# Patient Record
Sex: FEMALE | Race: WHITE
[De-identification: names, ages, dates, MRNs, and addresses within clinical notes are randomized per-mention and may not be internally consistent; named-entity substitution may affect disease eponyms.]

---

## 2020-11-24 ENCOUNTER — HOSPITAL ENCOUNTER (OUTPATIENT)
Dept: HOSPITAL 74 - FASUSAT | Age: 70
LOS: 1 days | Discharge: HOME | End: 2020-11-25
Attending: PLASTIC SURGERY
Payer: COMMERCIAL

## 2020-11-24 VITALS — BODY MASS INDEX: 27.1 KG/M2

## 2020-11-24 DIAGNOSIS — C44.311: Primary | ICD-10-CM

## 2020-11-24 PROCEDURE — 0HX1XZZ TRANSFER FACE SKIN, EXTERNAL APPROACH: ICD-10-PCS | Performed by: PLASTIC SURGERY

## 2020-11-24 PROCEDURE — 0JX10ZB TRANSFER FACE SUBCUTANEOUS TISSUE AND FASCIA WITH SKIN AND SUBCUTANEOUS TISSUE, OPEN APPROACH: ICD-10-PCS | Performed by: PLASTIC SURGERY

## 2020-11-24 PROCEDURE — 0HR1X73 REPLACEMENT OF FACE SKIN WITH AUTOLOGOUS TISSUE SUBSTITUTE, FULL THICKNESS, EXTERNAL APPROACH: ICD-10-PCS | Performed by: PLASTIC SURGERY

## 2020-11-24 PROCEDURE — 0HBAXZZ EXCISION OF INGUINAL SKIN, EXTERNAL APPROACH: ICD-10-PCS | Performed by: PLASTIC SURGERY

## 2020-11-24 RX ADMIN — CEFAZOLIN SODIUM SCH MLS/HR: 1 INJECTION, SOLUTION INTRAVENOUS at 20:30

## 2020-11-24 RX ADMIN — ONDANSETRON PRN MG: 2 INJECTION INTRAMUSCULAR; INTRAVENOUS at 23:49

## 2020-11-25 VITALS — TEMPERATURE: 97.9 F | DIASTOLIC BLOOD PRESSURE: 64 MMHG | HEART RATE: 72 BPM | SYSTOLIC BLOOD PRESSURE: 137 MMHG

## 2020-11-25 RX ADMIN — ONDANSETRON PRN MG: 2 INJECTION INTRAMUSCULAR; INTRAVENOUS at 05:32

## 2020-11-25 RX ADMIN — CEFAZOLIN SODIUM SCH MLS/HR: 1 INJECTION, SOLUTION INTRAVENOUS at 02:45

## 2020-11-25 RX ADMIN — CEFAZOLIN SODIUM SCH MLS/HR: 1 INJECTION, SOLUTION INTRAVENOUS at 08:51

## 2020-12-21 ENCOUNTER — HOSPITAL ENCOUNTER (OUTPATIENT)
Dept: HOSPITAL 74 - FASU | Age: 70
Discharge: HOME | End: 2020-12-21
Attending: PLASTIC SURGERY
Payer: COMMERCIAL

## 2020-12-21 VITALS — HEART RATE: 74 BPM | DIASTOLIC BLOOD PRESSURE: 82 MMHG | SYSTOLIC BLOOD PRESSURE: 150 MMHG

## 2020-12-21 VITALS — BODY MASS INDEX: 27.3 KG/M2

## 2020-12-21 VITALS — TEMPERATURE: 97.9 F

## 2020-12-21 DIAGNOSIS — C44.321: Primary | ICD-10-CM

## 2020-12-21 PROCEDURE — 0H81XZZ DIVISION OF FACE SKIN, EXTERNAL APPROACH: ICD-10-PCS | Performed by: PLASTIC SURGERY

## 2023-05-13 ENCOUNTER — TRANSCRIPTION ENCOUNTER (OUTPATIENT)
Age: 73
End: 2023-05-13

## 2023-05-14 ENCOUNTER — TRANSCRIPTION ENCOUNTER (OUTPATIENT)
Age: 73
End: 2023-05-14

## 2023-05-15 ENCOUNTER — TRANSCRIPTION ENCOUNTER (OUTPATIENT)
Age: 73
End: 2023-05-15

## 2023-05-16 ENCOUNTER — APPOINTMENT (OUTPATIENT)
Dept: CARE COORDINATION | Facility: HOME HEALTH | Age: 73
End: 2023-05-16
Payer: MEDICARE

## 2023-05-16 VITALS
SYSTOLIC BLOOD PRESSURE: 110 MMHG | RESPIRATION RATE: 16 BRPM | HEART RATE: 65 BPM | DIASTOLIC BLOOD PRESSURE: 62 MMHG | OXYGEN SATURATION: 99 %

## 2023-05-16 DIAGNOSIS — Z78.9 OTHER SPECIFIED HEALTH STATUS: ICD-10-CM

## 2023-05-16 PROBLEM — Z00.00 ENCOUNTER FOR PREVENTIVE HEALTH EXAMINATION: Status: ACTIVE | Noted: 2023-05-16

## 2023-05-16 PROCEDURE — 99349 HOME/RES VST EST MOD MDM 40: CPT

## 2023-05-20 PROBLEM — Z78.9 DENIES ALCOHOL CONSUMPTION: Status: ACTIVE | Noted: 2023-05-20

## 2023-05-20 RX ORDER — TICAGRELOR 90 MG/1
90 TABLET ORAL
Refills: 0 | Status: ACTIVE | COMMUNITY
Start: 2023-05-12

## 2023-05-20 RX ORDER — LANSOPRAZOLE 30 MG/1
30 CAPSULE, DELAYED RELEASE ORAL DAILY
Refills: 0 | Status: ACTIVE | COMMUNITY

## 2023-05-20 RX ORDER — PREGABALIN 75 MG/1
75 CAPSULE ORAL
Refills: 0 | Status: ACTIVE | COMMUNITY
Start: 2023-04-24

## 2023-05-20 RX ORDER — IPRATROPIUM BROMIDE 42 UG/1
0.06 SPRAY NASAL
Qty: 15 | Refills: 0 | Status: ACTIVE | COMMUNITY
Start: 2023-01-09

## 2023-05-20 RX ORDER — ABATACEPT 125 MG/ML
125 INJECTION, SOLUTION SUBCUTANEOUS
Refills: 0 | Status: ACTIVE | COMMUNITY

## 2023-05-20 RX ORDER — INSULIN GLARGINE 100 [IU]/ML
100 INJECTION, SOLUTION SUBCUTANEOUS
Refills: 0 | Status: ACTIVE | COMMUNITY

## 2023-05-20 RX ORDER — ATORVASTATIN CALCIUM 80 MG/1
80 TABLET, FILM COATED ORAL
Refills: 0 | Status: ACTIVE | COMMUNITY
Start: 2022-10-10

## 2023-05-20 RX ORDER — CALCIUM CARB/VIT D2/MINERALS
TABLET ORAL
Refills: 0 | Status: ACTIVE | COMMUNITY

## 2023-05-20 RX ORDER — ATENOLOL 50 MG/1
50 TABLET ORAL DAILY
Refills: 0 | Status: ACTIVE | COMMUNITY
Start: 2022-12-21

## 2023-05-20 RX ORDER — ASPIRIN 81 MG/1
81 TABLET, COATED ORAL
Refills: 0 | Status: ACTIVE | COMMUNITY
Start: 2023-05-12

## 2023-05-20 RX ORDER — ALENDRONATE SODIUM 70 MG/1
70 TABLET ORAL
Qty: 12 | Refills: 0 | Status: ACTIVE | COMMUNITY
Start: 2022-06-02

## 2023-05-20 RX ORDER — INSULIN LISPRO 100 [IU]/ML
100 INJECTION, SOLUTION INTRAVENOUS; SUBCUTANEOUS
Refills: 0 | Status: ACTIVE | COMMUNITY

## 2023-05-20 RX ORDER — MULTIVIT-MIN/FOLIC/VIT K/LYCOP 400-300MCG
50 MCG TABLET ORAL
Refills: 0 | Status: ACTIVE | COMMUNITY

## 2023-05-20 RX ORDER — METHYLPREDNISOLONE 8 MG/1
TABLET ORAL DAILY
Refills: 0 | Status: ACTIVE | COMMUNITY

## 2023-05-20 NOTE — REVIEW OF SYSTEMS
[Fever] : no fever [Chills] : no chills [Fatigue] : fatigue [Lower Ext Edema] : no lower extremity edema [Shortness Of Breath] : no shortness of breath [Dyspnea on Exertion] : no dyspnea on exertion [Abdominal Pain] : no abdominal pain [Nausea] : no nausea [Constipation] : no constipation [Diarrhea] : diarrhea [Vomiting] : no vomiting [Negative] : Psychiatric [FreeTextEntry5] : Mild chest discomfort [de-identified] : Right arm and groin brusing

## 2023-05-20 NOTE — PLAN
[FreeTextEntry1] : - Follow-up with PCP tomorrow as scheduled\par \par - Follow-up with Dr. Hoover as advised\par \par - Follow-up TCM NP visit scheduled to 5/22\par \par - Patient verbalized understanding of plan as above, advised to call with any questions or concerns.  Yellow card with contact info given.\par \par

## 2023-05-20 NOTE — ASSESSMENT
[FreeTextEntry1] : Patient is a 72 year y/o female enrolled in the [facility] program with a history of CAD, hypertension, hyperlipidemia, lupus, DM, RA recently admitted from 5/11/23-5/13/23 to [facility] for STEMI.  Hospital record reviewed, as per patient discharge summary:\par \par "Admission History\par Patient is a 72-year-old female with a history of CAD, hypertension, hyperlipidemia, lupus, DM, RA, multiple previous surgeries with chronic pain who presented as an outpatient for elective cardiac cath after she had been having crescendo angina for several months and equivocal outpatient nuclear stress test last year.  She underwent PCI today and was found to have two-vessel CAD with 80 to 90% proximal LAD occlusion and an 80% RCA occlusion both of which received stent placement.  Her left main and left circumflex arteries were patent.  After she went to cath recovery she developed significant vomiting and new onset chest pain and was found to have new ST elevations in the inferior leads.  She vomited up all of her initial aspirin and antiplatelet agents.  She returned to the Cath Lab for repeat PCI and was found to have in-stent thromboses of both stents.  She had balloon angioplasties to both stents, followed by IVUS to confirm patency.  There is no evidence of either coronary artery dissection or aortic dissection on aortogram.  Her LVEDP was reassuring as well as her LV gram.  She was put on a cangrelor drip.  She had an intra-aortic balloon pump placed in the left femoral artery.  She was subsequently transferred to the ICU for continued cangrelor infusion, heparin infusion, management of intra-aortic balloon pump, post STEMI care.  The cardiologist, Dr. Hinds, recommended checking a stat echo as well as initiating aspirin and Plavix and gentle IV fluids.\par \par Hospital Course\par Patient is s/p PCI of prox LAD with CAROLYNE x1 and PCI of prox-mid RCA with CAROLYNE x1 on 5/12/23 via RFA (unsuccessful RRA access), complicated by chest pain and inferior ST elevations post-cath, requiring repeat cath showing small thrombus in RCA and LAD stents, treated with IVUS-guided balloon angioplasty on 5/11/23.  Patient is now status post removal of the intra-aortic balloon pump (5/12 AM) and is clinically stable. She was monitored overnight on telemetry with no events. Labs/EKG this AM are stable. Patients course was complicated by significant hematoma at the site of the right radial cath insertion in the left femoral artery intra-aortic balloon pump site. Both of these sites have remained stable with no expansion. The areas are soft to touch. Hemoglobin remains stable and up from yesterday at 9.9. Evaluated by cardiac cath team this AM who cleared her for discharge home today. She will be discharged on ASA/Brilinta and will continue her statin. She will follow up with her cardiologist in 2 weeks. At this time patient is table for discharge home.\par \par Patient contact by Nely Jones RN on 5/15/23 and d/c instructions reviewed.  Discharge medications were reviewed and reconciled with the current medication list and medications in home.  Documentation can be found in clinical viewer.\par \par \par Patient evaluated in home and on arrival patient alert and oriented x3, and in no acute distress.  Patient visualize walking in home and gait appears stable without assistance.  Patient states she is feeling fatigued and having discomfort in right arm and groin from all the brusing and bleeding from the procedures. Right are brusing noted, and brusing to groin and hips.  On exam feels soft to touch.  Patient denies chest pain, but a mild discomfort that she has had since the procedure and cardiology aware.  Advised to go directly to the ER for any worsening chest discomfort or pain.  Patient states she has a follow-up appointment with PCP tomorrow and is in the process of scheduling cardiology.  Patient denies chest pain, palpitations, shortness of breath, abdominal pain, nausea, vomiting, diarrhea, lightheaded or dizziness.\par \par Patient with no other questions or concerns at this time.  Patient given yellow card with contact information on it and advised to call with any questions or concerns.\par

## 2023-05-20 NOTE — PHYSICAL EXAM
[No Acute Distress] : no acute distress [Well Developed] : well developed [Well Nourished] : well nourished [Well-Appearing] : well-appearing [Normal Sclera/Conjunctiva] : normal sclera/conjunctiva [Normal Outer Ear/Nose] : the outer ears and nose were normal in appearance [Supple] : supple [No Respiratory Distress] : no respiratory distress  [No Accessory Muscle Use] : no accessory muscle use [Clear to Auscultation] : lungs were clear to auscultation bilaterally [Regular Rhythm] : with a regular rhythm [Normal Rate] : normal rate  [Normal S1, S2] : normal S1 and S2 [No Edema] : there was no peripheral edema [Soft] : abdomen soft [Non Tender] : non-tender [Non-distended] : non-distended [Normal Bowel Sounds] : normal bowel sounds [No Focal Deficits] : no focal deficits [Normal Gait] : normal gait [Normal Affect] : the affect was normal [Alert and Oriented x3] : oriented to person, place, and time [Normal Mood] : the mood was normal [Normal Insight/Judgement] : insight and judgment were intact [de-identified] : ecchymosis to right arm, groin, and b/i hips, soft to touch

## 2023-05-20 NOTE — HISTORY OF PRESENT ILLNESS
[FreeTextEntry1] : Follow-up for discharge from [facility] for NSTEMI\par  [de-identified] : Patient is a 72 year y/o female enrolled in the [facility] program with a history of CAD, hypertension, hyperlipidemia, lupus, DM, RA recently admitted from 5/11/23-5/13/23 to [facility] for STEMI.  Hospital record reviewed, as per patient discharge summary:\par \par "Admission History\par Patient is a 72-year-old female with a history of CAD, hypertension, hyperlipidemia, lupus, DM, RA, multiple previous surgeries with chronic pain who presented as an outpatient for elective cardiac cath after she had been having crescendo angina for several months and equivocal outpatient nuclear stress test last year.  She underwent PCI today and was found to have two-vessel CAD with 80 to 90% proximal LAD occlusion and an 80% RCA occlusion both of which received stent placement.  Her left main and left circumflex arteries were patent.  After she went to cath recovery she developed significant vomiting and new onset chest pain and was found to have new ST elevations in the inferior leads.  She vomited up all of her initial aspirin and antiplatelet agents.  She returned to the Cath Lab for repeat PCI and was found to have in-stent thromboses of both stents.  She had balloon angioplasties to both stents, followed by IVUS to confirm patency.  There is no evidence of either coronary artery dissection or aortic dissection on aortogram.  Her LVEDP was reassuring as well as her LV gram.  She was put on a cangrelor drip.  She had an intra-aortic balloon pump placed in the left femoral artery.  She was subsequently transferred to the ICU for continued cangrelor infusion, heparin infusion, management of intra-aortic balloon pump, post STEMI care.  The cardiologist, Dr. Hinds, recommended checking a stat echo as well as initiating aspirin and Plavix and gentle IV fluids.\par \par Hospital Course\par Patient is s/p PCI of prox LAD with CAROLYNE x1 and PCI of prox-mid RCA with CAROLYNE x1 on 5/12/23 via RFA (unsuccessful RRA access), complicated by chest pain and inferior ST elevations post-cath, requiring repeat cath showing small thrombus in RCA and LAD stents, treated with IVUS-guided balloon angioplasty on 5/11/23.  Patient is now status post removal of the intra-aortic balloon pump (5/12 AM) and is clinically stable. She was monitored overnight on telemetry with no events. Labs/EKG this AM are stable. Patients course was complicated by significant hematoma at the site of the right radial cath insertion in the left femoral artery intra-aortic balloon pump site. Both of these sites have remained stable with no expansion. The areas are soft to touch. Hemoglobin remains stable and up from yesterday at 9.9. Evaluated by cardiac cath team this AM who cleared her for discharge home today. She will be discharged on ASA/Brilinta and will continue her statin. She will follow up with her cardiologist in 2 weeks. At this time patient is table for discharge home.\par \par Patient contact by Nely Jones RN on 5/15/23 and d/c instructions reviewed.  Discharge medications were reviewed and reconciled with the current medication list and medications in home.  Documentation can be found in clinical viewer.\par \par \par Patient evaluated in home and on arrival patient alert and oriented x3, and in no acute distress.  Patient visualize walking in home and gait appears stable without assistance.  Patient states she is feeling fatigued and having discomfort in right arm and groin from all the brusing and bleeding from the procedures. Right are brusing noted, and brusing to groin and hips.  On exam feels soft to touch.  Patient denies chest pain, but a mild discomfort that she has had since the procedure and cardiology aware.  Advised to go directly to the ER for any worsening chest discomfort or pain.  Patient states she has a follow-up appointment with PCP tomorrow and is in the process of scheduling cardiology.  Patient denies chest pain, palpitations, shortness of breath, abdominal pain, nausea, vomiting, diarrhea, lightheaded or dizziness.\par \par Patient with no other questions or concerns at this time.  Patient given yellow card with contact information on it and advised to call with any questions or concerns.\par \par \par

## 2023-05-22 ENCOUNTER — APPOINTMENT (OUTPATIENT)
Dept: CARE COORDINATION | Facility: HOME HEALTH | Age: 73
End: 2023-05-22
Payer: MEDICARE

## 2023-05-22 VITALS — DIASTOLIC BLOOD PRESSURE: 58 MMHG | SYSTOLIC BLOOD PRESSURE: 96 MMHG | HEART RATE: 64 BPM | RESPIRATION RATE: 16 BRPM

## 2023-05-22 DIAGNOSIS — I21.3 ST ELEVATION (STEMI) MYOCARDIAL INFARCTION OF UNSPECIFIED SITE: ICD-10-CM

## 2023-05-22 DIAGNOSIS — Z98.890 OTHER SPECIFIED POSTPROCEDURAL STATES: ICD-10-CM

## 2023-05-22 DIAGNOSIS — M32.9 SYSTEMIC LUPUS ERYTHEMATOSUS, UNSPECIFIED: ICD-10-CM

## 2023-05-22 DIAGNOSIS — R58 HEMORRHAGE, NOT ELSEWHERE CLASSIFIED: ICD-10-CM

## 2023-05-22 DIAGNOSIS — I10 ESSENTIAL (PRIMARY) HYPERTENSION: ICD-10-CM

## 2023-05-22 DIAGNOSIS — I00 RHEUMATIC FEVER W/OUT HEART INVOLVEMENT: ICD-10-CM

## 2023-05-22 DIAGNOSIS — E11.9 TYPE 2 DIABETES MELLITUS W/OUT COMPLICATIONS: ICD-10-CM

## 2023-05-22 PROCEDURE — 99349 HOME/RES VST EST MOD MDM 40: CPT

## 2023-05-23 PROBLEM — R58 ECCHYMOSIS: Status: ACTIVE | Noted: 2023-05-20

## 2023-05-23 PROBLEM — M32.9 LUPUS: Status: ACTIVE | Noted: 2023-05-20

## 2023-05-23 PROBLEM — I21.3 STEMI (ST ELEVATION MYOCARDIAL INFARCTION): Status: ACTIVE | Noted: 2023-05-20

## 2023-05-23 PROBLEM — E11.9 DIABETES MELLITUS, TYPE 2: Status: ACTIVE | Noted: 2023-05-20

## 2023-05-23 PROBLEM — Z98.890 S/P CARDIAC CATHETERIZATION: Status: ACTIVE | Noted: 2023-05-20

## 2023-05-23 PROBLEM — I00 RHEUMATIC ARTERITIS: Status: ACTIVE | Noted: 2023-05-20

## 2023-05-23 PROBLEM — I10 HTN (HYPERTENSION): Status: ACTIVE | Noted: 2023-05-20

## 2023-05-23 NOTE — REVIEW OF SYSTEMS
[Fever] : no fever [Chills] : no chills [Fatigue] : fatigue [Lower Ext Edema] : no lower extremity edema [Shortness Of Breath] : no shortness of breath [Dyspnea on Exertion] : no dyspnea on exertion [Abdominal Pain] : no abdominal pain [Nausea] : no nausea [Constipation] : no constipation [Diarrhea] : diarrhea [Vomiting] : no vomiting [Negative] : Psychiatric [FreeTextEntry5] : Mild chest discomfort [de-identified] : Right arm and groin bruising - Improving

## 2023-05-23 NOTE — PHYSICAL EXAM
[No Acute Distress] : no acute distress [Well Nourished] : well nourished [Well Developed] : well developed [Well-Appearing] : well-appearing [Normal Sclera/Conjunctiva] : normal sclera/conjunctiva [Normal Outer Ear/Nose] : the outer ears and nose were normal in appearance [No Respiratory Distress] : no respiratory distress  [Supple] : supple [No Accessory Muscle Use] : no accessory muscle use [Clear to Auscultation] : lungs were clear to auscultation bilaterally [Normal Rate] : normal rate  [Regular Rhythm] : with a regular rhythm [Normal S1, S2] : normal S1 and S2 [No Edema] : there was no peripheral edema [Soft] : abdomen soft [Non Tender] : non-tender [Non-distended] : non-distended [Normal Bowel Sounds] : normal bowel sounds [No Focal Deficits] : no focal deficits [Normal Gait] : normal gait [Normal Affect] : the affect was normal [Alert and Oriented x3] : oriented to person, place, and time [Normal Mood] : the mood was normal [Normal Insight/Judgement] : insight and judgment were intact [de-identified] : ecchymosis to right arm, groin, and b/i hips, soft to touch

## 2023-05-23 NOTE — ASSESSMENT
[FreeTextEntry1] : Patient is a 72 year y/o female enrolled in the [facility] program with a history of CAD, hypertension, hyperlipidemia, lupus, DM, RA recently admitted from 5/11/23-5/13/23 to [facility] for STEMI.  Patient seen in follow-up with STEMI.  Patient states she feels much improved from last week, states having less pain in right arm, groin and hips from the bruising and energy has improved.  Patient states she does have occasional chest discomfort which informed the cardiologist who states it is normal.  On exam, brusing to arm, groin and hips improving.  On exam patient blood pressure to be 96/58.  Patient stats she has occasional lightheadedness when standing up, or bending down and coming back up.  Advised patient to hold losartan and to make sure she is drinking enough.  Notified Dr. Hinds (patient's cardiologist) who is agreeable to plan.  Advised patient to monitor blood pressures at home.  Patient denies chest pain, palpitations, shortness of breath, abdominal pain, nausea, vomiting, diarrhea, lightheaded or dizziness.  Patient with no other questions or concerns at this time.  Patient given yellow card with contact information on it and advised to call with any questions or concerns.

## 2023-05-23 NOTE — HISTORY OF PRESENT ILLNESS
[FreeTextEntry1] : Follow-up for discharge from [facility] for NSTEMI\par  [de-identified] : Patient is a 72 year y/o female enrolled in the [facility] program with a history of CAD, hypertension, hyperlipidemia, lupus, DM, RA recently admitted from 5/11/23-5/13/23 to [facility] for STEMI.  Patient seen in follow-up with STEMI.  Patient states she feels much improved from last week, states having less pain in right arm, groin and hips from the bruising and energy has improved.  Patient states she does have occasional chest discomfort which informed the cardiologist who states it is normal.  On exam, brusing to arm, groin and hips improving.  On exam patient blood pressure to be 96/58.  Patient stats she has occasional lightheadedness when standing up, or bending down and coming back up.  Advised patient to hold losartan and to make sure she is drinking enough.  Notified Dr. Hinds (patient's cardiologist) who is agreeable to plan.  Advised patient to monitor blood pressures at home.  Patient denies chest pain, palpitations, shortness of breath, abdominal pain, nausea, vomiting, diarrhea, lightheaded or dizziness.\par \par Patient with no other questions or concerns at this time.  Patient given yellow card with contact information on it and advised to call with any questions or concerns.\par \par

## 2024-07-23 ENCOUNTER — TRANSCRIPTION ENCOUNTER (OUTPATIENT)
Age: 74
End: 2024-07-23

## 2024-08-28 ENCOUNTER — TRANSCRIPTION ENCOUNTER (OUTPATIENT)
Age: 74
End: 2024-08-28

## 2024-10-15 ENCOUNTER — NON-APPOINTMENT (OUTPATIENT)
Age: 74
End: 2024-10-15

## 2024-10-16 ENCOUNTER — APPOINTMENT (OUTPATIENT)
Dept: NEUROSURGERY | Facility: CLINIC | Age: 74
End: 2024-10-16
Payer: MEDICARE

## 2024-10-16 VITALS — SYSTOLIC BLOOD PRESSURE: 145 MMHG | HEART RATE: 67 BPM | DIASTOLIC BLOOD PRESSURE: 80 MMHG | OXYGEN SATURATION: 96 %

## 2024-10-16 DIAGNOSIS — M51.16 INTERVERTEBRAL DISC DISORDERS WITH RADICULOPATHY, LUMBAR REGION: ICD-10-CM

## 2024-10-16 PROCEDURE — 99205 OFFICE O/P NEW HI 60 MIN: CPT

## 2024-10-18 ENCOUNTER — RESULT REVIEW (OUTPATIENT)
Age: 74
End: 2024-10-18

## 2024-10-19 ENCOUNTER — RESULT REVIEW (OUTPATIENT)
Age: 74
End: 2024-10-19

## 2024-10-22 ENCOUNTER — TRANSCRIPTION ENCOUNTER (OUTPATIENT)
Age: 74
End: 2024-10-22

## 2024-11-07 ENCOUNTER — NON-APPOINTMENT (OUTPATIENT)
Age: 74
End: 2024-11-07

## 2024-12-12 ENCOUNTER — APPOINTMENT (OUTPATIENT)
Dept: NEUROSURGERY | Facility: CLINIC | Age: 74
End: 2024-12-12
Payer: MEDICARE

## 2024-12-12 ENCOUNTER — NON-APPOINTMENT (OUTPATIENT)
Age: 74
End: 2024-12-12

## 2024-12-12 VITALS
OXYGEN SATURATION: 98 % | SYSTOLIC BLOOD PRESSURE: 124 MMHG | DIASTOLIC BLOOD PRESSURE: 68 MMHG | HEIGHT: 60 IN | HEART RATE: 74 BPM

## 2024-12-12 DIAGNOSIS — M47.27 OTHER SPONDYLOSIS WITH RADICULOPATHY, LUMBOSACRAL REGION: ICD-10-CM

## 2024-12-12 DIAGNOSIS — M51.16 INTERVERTEBRAL DISC DISORDERS WITH RADICULOPATHY, LUMBAR REGION: ICD-10-CM

## 2024-12-12 PROCEDURE — 99215 OFFICE O/P EST HI 40 MIN: CPT

## 2025-09-02 ENCOUNTER — NON-APPOINTMENT (OUTPATIENT)
Age: 75
End: 2025-09-02

## 2025-09-03 ENCOUNTER — APPOINTMENT (OUTPATIENT)
Dept: BARIATRICS/WEIGHT MGMT | Facility: CLINIC | Age: 75
End: 2025-09-03
Payer: MEDICARE

## 2025-09-03 VITALS — SYSTOLIC BLOOD PRESSURE: 144 MMHG | DIASTOLIC BLOOD PRESSURE: 92 MMHG

## 2025-09-03 DIAGNOSIS — M32.9 SYSTEMIC LUPUS ERYTHEMATOSUS, UNSPECIFIED: ICD-10-CM

## 2025-09-03 DIAGNOSIS — N18.30 CHRONIC KIDNEY DISEASE, STAGE 3 UNSPECIFIED: ICD-10-CM

## 2025-09-03 DIAGNOSIS — N18.9 CHRONIC KIDNEY DISEASE, UNSPECIFIED: ICD-10-CM

## 2025-09-03 DIAGNOSIS — E11.9 TYPE 2 DIABETES MELLITUS W/OUT COMPLICATIONS: ICD-10-CM

## 2025-09-03 PROCEDURE — 99203 OFFICE O/P NEW LOW 30 MIN: CPT

## 2025-09-03 RX ORDER — CLOPIDOGREL 75 MG/1
75 TABLET, FILM COATED ORAL
Refills: 0 | Status: ACTIVE | COMMUNITY
Start: 2025-09-03

## 2025-09-03 RX ORDER — BIFIDOBACTERIUM LONGUM 10MM CELL
10 CAPSULE ORAL
Refills: 0 | Status: ACTIVE | COMMUNITY
Start: 2025-09-03

## 2025-09-03 RX ORDER — LOSARTAN POTASSIUM 25 MG/1
25 TABLET, FILM COATED ORAL
Refills: 0 | Status: ACTIVE | COMMUNITY